# Patient Record
Sex: FEMALE | Race: BLACK OR AFRICAN AMERICAN | Employment: FULL TIME | URBAN - METROPOLITAN AREA
[De-identification: names, ages, dates, MRNs, and addresses within clinical notes are randomized per-mention and may not be internally consistent; named-entity substitution may affect disease eponyms.]

---

## 2019-03-07 ENCOUNTER — HOSPITAL ENCOUNTER (OUTPATIENT)
Dept: PHYSICAL THERAPY | Age: 69
Discharge: HOME | End: 2019-03-07
Payer: MEDICARE

## 2019-03-07 PROCEDURE — 97530 THERAPEUTIC ACTIVITIES: CPT

## 2019-03-07 PROCEDURE — 97161 PT EVAL LOW COMPLEX 20 MIN: CPT

## 2019-03-07 NOTE — PROGRESS NOTES
[address], [address]        OUTPATIENT physical Therapy Evaluation with CMS G codes    NAME: Shruthi Koenig AGE: 80 y.o. GENDER: female  DATE: 3/7/2019  REFERRING PHYSICIAN: Johanny Vivas MD  HISTORY AND BACKGROUND:  Pt is a 81 yo female presenting to Lymphedema clinic for re-evaluation of right UE secondary lymphedema and ordering of new compression garments. Pt previously treated at [address] [place] [address] for many years; last seen in 2015. Pt was discharged at that time with a custom arm sleeve and glove for R UE. Pt says she has not been seen at a lymphedema clinic since that time, but continues to wear her custom arm sleeves. Pt says she has 4 sleeves that she alternates, says she does not wear gloves due to no hand involvement. Pt has tried two vasopneumatic pumps, but says she felt they made swelling worse. Pt felt kinesiotape has helped with swelling in the past. Pt reports 12 lb weight gain over the last couple years. Past medical history: s/p modified bilateral radical mastectomy in 2003 with removal of 17 lymph nodes on the right side, pt underwent chemotherapy in 2002 and 32 radiation treatments in 2002. 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 See scanned document.    Past Medical History:   Past Medical History:   Diagnosis Date    Cancer ([facility] [address]     Hypertension      Past Surgical History:   Procedure Laterality Date    BREAST SURGERY PROCEDURE UNLISTED Current Medications:    Current Outpatient Medications   Medication Sig    lorazepam (ATIVAN) 1 mg tablet Take 1 tablet by mouth every eight (8) hours as needed for Anxiety.  hydrocodone-acetaminophen (NORCO) 5-325 mg per tablet Take 1 tablet by mouth every six (6) hours as needed for Pain.  cyclobenzaprine (FLEXERIL) 10 mg tablet Take 1 Tab by mouth three (3) times daily as needed for Muscle Spasm(s).  ibuprofen (MOTRIN) 600 mg tablet Take 1 Tab by mouth every six (6) hours as needed for Pain.  valsartan (DIOVAN) 40 mg tablet Take 40 mg by mouth daily. No current facility-administered medications for this encounter. Allergies: Allergies   Allergen Reactions    Codeine Other (comments)     Hallucinations. 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 Pt does yoga and lymphedema ex's at home. Previous Therapy:  Seen at [facility] Lymphedema clinic from 2006 to 2015  Compression/Lymphedema Equipment:  [employer] custom arm sleeves and gloves, says she has a nighttime garment (did not bring today), old bandaging supplies at home, has tried vasopneumatic pump with poor success. SUBJECTIVE:  \"I probably need some new sleeves. I've gained some weight and they are too tight. Have they come up with a cure for this yet? \"     Patients goals for therapy: reduce swelling, obtain new compression garments     EVALUATION AND OBJECTIVE DATA SUMMARY:   Pain:   pt denies pain                                Self-Care and ADLs:  Mod I  Skin and Tissue Assessment:  Dermal Status:  [x]   Intact []  Dry   []  Tenuous [] Flaky   []  Wound/lesion present []  Scars   []  Dermatitis Texture/Consistency:  [x]  Boggy: right upper arm  []  Pitting Edema   []  Brawny []  Combination   []  Fibrotic/Woody    Pigmentation/Color Change:  [x]  Normal []  Hemosiderin   []  Red []  Erythematous   []  Hyperpigmented []  Hyperlipodermatosclerosis   Anomalies:  []  Lymphorrhea []  Vesicles   []  Petechiae []  Warty Vercusis   []  Bullae []  Papilloma   Circulatory:  []  MICHELLE []  Varicosities:   []  Pulses [x]  Vascular studies ruled out DVT in past   []  DVT History    Nails:  [x]   Normal  []   Fungus    Stemmers Sign: negative   Height:   5'1\"  Weight:   177 lbs (was 150 lbs in 2015)   BMI:   33.4  (36 or greater: adversely affecting lymphedema)  Wound/Ulcer:  None       Wound Pain:   N/a   Volumetric Measurements:   Right:  3,750.49 mL Left:  2,926.12 mL   % Difference: 28.17% Dominance: right    (See scanned graph)  R UE has increased by 661.9 mL since 2015  L UE has increased by 557.2 mL since 2015  % difference was 30% in 2015 (R>L)    Range of Motion: [facility]  Strength: WFL    Sensation:   Intact  Mobility:  Modified independence  Safety:  Patient is alert and oriented:  Yes x 4   Safety awareness:  Good    Fall Risk?:  Low    Patient given written fall prevention handout: Yes   Precautions:  Standard lymphedema precautions to include avoiding blood pressure readings, injections and IVs or other procedures/acts that could lead to broken skin on affected area, and avoiding excessive heat, resistive activity or altitude without compression garment    Evaluation Time: 10:35 - 11:05 am (30 minutes)    TREATMENT PROVIDED:   Treatment time:  11:05 - 11:30 am  Minutes: 25  1. Treatment description:  Therapeutic Activity x 2  The patient was educated regarding the role and function of the lymphatic system, and instructed in the lymphedema management protocol of complete decongestive therapy (CDT).   This includes skin care to prevent breakdown or infection, lymphedema exercises, custom compression therapy options (bandaging, compression garments, compression pump, Ferol Mean, JoViPak, The Mike-Jose David, etc. as needed), and decongestion with manual lymphatic drainage as indicated. We discussed the need for consistent compression system for lymphedema management. Diaphragmatic breathing instruction and skin care education was performed, applying low pH lotion to extremity using upward strokes to stimulate lymphatic vessels. Discussed plan of care at length. Pt has a 28% limb volume difference currently. In the past, her limb volume difference has fluctuated between 25% to 45%. Pt's current garments require replacement since they are 3years old and not fitting properly. Discussed measuring pt for custom garments today or returning to phase I CDT with bandaging trial and MLD to try to reduce limb volume prior to measuring for garments. Pt has decided to try bandaging first before measuring for compression garments. Pt to bring her nighttime garments for therapist to look at next visit as well as the bandages she has at home. ASSESSMENT:   Emilia Verdugo is a 80 y.o. female who presents with stage II R UE secondary lymphedema s/p mastectomy in 2002 with removal of 17 lymph nodes. Patient has been managing lymphedema with use of custom arm sleeve, but does not wear hand piece. Patient last seen at [facility] lymphedema clinic in 2014. Discussed plan of care at length. Pt has a 28% limb volume difference currently. In the past, her limb volume difference has fluctuated between 25% to 45%. Pt's current garments require replacement since they are 3years old and not fitting properly. Discussed measuring pt for custom garments today or returning to phase I CDT with bandaging trial and MLD to try to reduce limb volume prior to measuring for garments. Pt has decided to try bandaging first before measuring for compression garments.  Pt to bring her nighttime garments for therapist to look at next visit as well as the bandages she has at home. Patient will benefit from complete decongestive therapy (CDT) including manual lymphatic drainage (MLD) technique, short-stretch textile bandages/compression system to decongest limb, and kinesiotaping as appropriate. Patient will receive instruction in proper skin care to recognize signs/symptoms of and prevent infection, therapeutic exercise, and self-MLD for independent home program and restorative lymphatic performance. This care is medically necessary due to the infection risk with lymphedema and to improve functional activities. CDT is necessary to resolve swelling to allow patient to return to wearing normal clothes/footwear and prevent worsening of symptoms such as venous stasis ulcerations, infections, or hospitalizations. Patient will be independent with home program strategies to allow improved ADL ability and mobility and to allow patient to return to greatest functional independence. Rehabilitation potential is considered to be Good. Factors which may influence rehabilitation potential include compliance with frequent re-evaluations and ordering of replacement garments every 6 months. Patient will benefit from 2x/week physical therapy visits over 6-8 weeks to optimize improvement in these areas. PLAN OF CARE:   Recommendations and Planned Interventions:  Manual lymph drainage/complete decongestive therapy  Multi-layer compression bandaging (short-stretch)  Compression garment fitting/provision  Lymphedema therapeutic exercise  AROM/PROM/Strength/Coordination  Self-care training  Functional mobility training  Education in skin care and lymphedema precautions  Self-MLD education per home program  Self-bandaging education per home program  Caregiver education as needed  Wound care as needed  SCD as indicated      GOALS  Short term goals  Time frame: 4 weeks  1.   Patient will demonstrate knowledge of signs/symptoms of infections/cellulitis and be independent in skin care to prevent cellulitis. 2.  Patient will demonstrate independence in lymphedema home program of therapeutic exercises to improve circulation and decongest limb to improve ADLs. 3.  Patient will tolerate multi-layer bandages (MLB) and show measureable decrease in R UE limb volume to <25% limb volume difference to allow ordering of home compression system (daytime, nighttime garments and pump as needed). Long term goals  Time frame: 8 weeks  1. Pt will show improvement in Lymphedema Life Impact Scale by 10 points for improved tolerance for ADLs and recreational activities. 2.  Patient will be independent with don/doff of compression system and use in order to prevent reaccumulation of fluid at discharge. 3.  Pt will be independent in self-MLD and show stable limb volumes showing decongestion and pt. ready for transition to independent restorative phase of lymphedema therapy. Physical Therapy Evaluation Charge Determination   History Examination Presentation Decision-Making   MEDIUM  Complexity : 1-2 comorbidities / personal factors will impact the outcome/ POC  LOW Complexity : 1-2 Standardized tests and measures addressing body structure, function, activity limitation and / or participation in recreation  LOW Complexity : Stable, uncomplicated  Other outcome measures LLIS  LOW       Based on the above components, the patient evaluation is determined to be of the following complexity level: LOW       Patient has participated in goal setting and agrees to work toward plan of care. Patient was instructed to call if questions or concerns arise. Thank you for this referral.  Mahesh Cuellar, PT, DPT, CLT   Time Calculation: 55 mins  TREATMENT PLAN EFFECTIVE DATES:   3/7/2019 TO 5/30/2019  I have read the above plan of care for Mishel Hutchison. I certify the above prescribed services are required by this patient and are medically necessary.   The above plan of care has been developed in conjunction with the lymphedema/physical therapist.       Physician Signature: ____________________________________Date:______________

## 2019-04-03 ENCOUNTER — HOSPITAL ENCOUNTER (OUTPATIENT)
Dept: PHYSICAL THERAPY | Age: 69
Discharge: HOME | End: 2019-04-03
Payer: MEDICARE

## 2019-04-03 PROCEDURE — 97140 MANUAL THERAPY 1/> REGIONS: CPT

## 2019-04-03 NOTE — PROGRESS NOTES
_                                   
[facility] [address] [place] Alvarado LYmphedema Therapy Visit: 2 [x]                  Daily note               []                 30 day/10th visit progress note NAME: Sam Lovett DATE: 4/3/2019 GOALS Short term goals Time frame: 4 weeks 1. Patient will demonstrate knowledge of signs/symptoms of infections/cellulitis and be independent in skin care to prevent cellulitis. 2.  Patient will demonstrate independence in lymphedema home program of therapeutic exercises to improve circulation and decongest limb to improve ADLs. 3.  Patient will tolerate multi-layer bandages (MLB) and show measureable decrease in R UE limb volume to <25% limb volume difference to allow ordering of home compression system (daytime, nighttime garments and pump as needed).  
  
Long term goals Time frame: 8 weeks 1. Pt will show improvement in Lymphedema Life Impact Scale by 10 points for improved tolerance for ADLs and recreational activities. 2.  Patient will be independent with don/doff of compression system and use in order to prevent reaccumulation of fluid at discharge. 3.  Pt will be independent in self-MLD and show stable limb volumes showing decongestion and pt. ready for transition to independent restorative phase of lymphedema therapy. SUBJECTIVE REPORT:  Patient arrives to appt with her old bandages and nighttime garment as requested. Pt eager to begin treatment of right UE lymphedema. Pain: 
 pt denies pain Gait:   
[x]  Independent gait without any assistive device for community distances ADLs:  Mod I Treatment Response:  Good response to MLD and right UE MLB [x]   Patient reviewed packet received at evaluation 
[]   Patient completed home program as prescribed 
[]   Patient not fully compliant with home program to date []   Patient waiting on compression garment arrival 
Function: limited by right UE swelling 
[]   Patient requiring less assistance with completion of home program 
[]   ADLs are requiring less assistance  
[]   Patient able to return to work/leisure activities Lymphedema Life Impact Scale: NT Weight: NT 
TREATMENT AND OBJECTIVE DATA SUMMARY:  
Patient/Family Education: Compression bandaging instructions: 1. Compression bandaging will be applied twice a week by therapist in clinic, with adjustments to be made at home as indicated if bandaging becomes loose or uncomfortable. 2. If tolerated, remain bandaged between appointments with therapist, removing under the following conditionsDO NOT WAIT FOR A RETURN PHONE [address]: 
-Numbness/tingling in extremity different from what you have experienced without the bandages in place. 
-Compromise in circulation, monitoring blood refill into toes after applying gentle pressure to toes. 
-Onset of pain in extremity that is sudden and severe in nature. -Redness, warmth in limb, and/or fever, flu-like symptoms, which may indicate infection. If this occurs, call your doctor right away. If you note a sudden increase in swelling in extremity, with or without redness/warmth, go to the emergency room as soon as possible to have blood clot ruled out. 3. Compression bandaging supplies that can be laundered are the brown compression wraps and any foam applied for padding. Launder in washer/dryer with NO fabric softener, bleach, woolite. Dry on a low heat. 4. Once compression garments are ordered, compression bandaging will be continued until garments arrive for fitting. This process can take several weeks. Educated in skin care: [x]   Skin care products []   Hygiene 
[]  Prevention of cellulitis 
[]   Wound care Educated in exercise: [x]   Walking program 
[x]   Jacqueline ball routine - pt has jacqueline messer at home, advised pt to perform ex's daily with compression bandages  
[]   Stick routine 
[]   ROM routine Instructed in self MLD:  
Written sequence given and reviewed with patient as well as demonstration and instruction during MLD portion of the session. Instructed in don/doff of compression system:  
[x]   Multi layer bandage (MLB) donning principles and wear precautions []   Day garments []   Night garments Therapeutic Exercise/Procedure minutes Treatment time:  
Jacqueline ball exercise program: Demonstration by therapist of written routine. Patient performed each x5 reps. Stick exercise program: N/A Free exercises/ROM: N/A Home program: Patient to perform daily to BID: 
[]   Skin care [x]   Deep abdominal breathing 
[x]   Exercise routine [x]   Walking program 
[]   Rest in supine  
[]   Compression bandage 
[]   Compression garments 
[]   Vasopneumatic device 
[]   Wound care 
[]   Self MLD [x]   Bring supplies to each therapy visit 
[]   Purchase necessary supplies 
[]   Weight loss program 
[]   Follow up with Rationale: Exercise will increase the lymph angiomotoricity and tissue pressure of the skin and thus decrease swelling. Modalities  minutes Treatment time:  
Vasopneumatic pump:   
 
Manual Lymphatic Drainage (MLD) 60 minutes Treatment time: 8:35 - 9:35 am 
Area to decongest:   
[x] UE 
        [x]  Right     []  Left      [] Trunk 
 
 
[] LE   
         []  Right     []  Left      [] Trunk Sequence used and effectiveness: [x] Secondary/Primary sequence for upper extremity with / without trunk involvement - pt able to tolerate supine and left sidelying positions for MLD portion. [] Secondary/Primary sequence for lower extremities with / without trunk involvement  
 
[] Modifications were made to manual lymph drainage sequence to exclude cervical techniques secondary to patient's age [x] Self MLD sequence/techniques/hand strokes - instructed in self MLD for trunk and chest wall to perform at home daily along with diaphragmatic breathing. Will review full self-MLD sequence once pt has transitioned to garments. Skin/wound care/debridement: Applied eucerin lotion to right UE using upward strokes to stimulate lymphatic vessels. Upper/Lower extremity compression: Applied multi-layer compression bandaging to: Below knee [] Thigh high  [] Upper Extremity [x] Right [x]   Left []    Bilateral [] The following multi-layer bandages were applied: 
[]  Tricofix           
[]  Silver Stockinette [x]  Tg soft 
[]  Comperm 
 
[]  Transelast  
 
Padding layer: 
[x]  Cast padding to hand/wrist/forearm with extra padding to bulk out wrist. Applied cast padding to elbow for increased comfort. []  Fleece Foam: 
[] 10cm roll 
[x] 12cm roll 
[] 15cm roll 
[] Gray foam 
[] Komprex 
[] Komprex 2 Short stretch bandages:  
[] 4cm [x] 6cm [x] 8cm [x] 10cm 
[] 12cm Tubigrip at top to secure taped areas. Educated patient in multi-layer bandaging donning principles and precautions. Kinesiotaping:   
Girth/Volume measurement: Reviewed results of volumetric measurements taken on evaluation. TOTAL TREATMENT 60 mins ASSESSMENT:  
Treatment effectiveness and tolerance: Good response to initial MLD sequence and right UE MLB. Pt is familiar with components of phase I CDT from treatment at lymphedema clinic years ago. Will continue with MLD and MLB until right UE volumes reduced and pt ready to be measured for custom garments. Progress toward goals: Progressing towards goals. PLAN OF CARE:  
Changes to the plan of care: 1. Right UE MLD and MLB. 2. Progress HEP and self-MLD. 3. Measure for garments once volumes reduced. Frequency: [x]  2 times a week 
[]  Weekly []  Biweekly []  Monthly Other:   
 
 
Cali Sosa, PT, DPT, CLT

## 2019-04-05 ENCOUNTER — HOSPITAL ENCOUNTER (OUTPATIENT)
Dept: PHYSICAL THERAPY | Age: 69
Discharge: HOME | End: 2019-04-05
Payer: MEDICARE

## 2019-04-05 PROCEDURE — 97140 MANUAL THERAPY 1/> REGIONS: CPT

## 2019-04-05 NOTE — PROGRESS NOTES
_                                   
[address] [facility] [address] [facility] [address] LYmphedema Therapy Visit: 3 [x]                  Daily note               []                 30 day/10th visit progress note NAME: Raegan Neal DATE: 4/5/2019 GOALS Short term goals Time frame: 4 weeks 1. Patient will demonstrate knowledge of signs/symptoms of infections/cellulitis and be independent in skin care to prevent cellulitis. 2.  Patient will demonstrate independence in lymphedema home program of therapeutic exercises to improve circulation and decongest limb to improve ADLs. 3.  Patient will tolerate multi-layer bandages (MLB) and show measureable decrease in R UE limb volume to <25% limb volume difference to allow ordering of home compression system (daytime, nighttime garments and pump as needed).  
  
Long term goals Time frame: 8 weeks 1. Pt will show improvement in Lymphedema Life Impact Scale by 10 points for improved tolerance for ADLs and recreational activities. 2.  Patient will be independent with don/doff of compression system and use in order to prevent reaccumulation of fluid at discharge. 3.  Pt will be independent in self-MLD and show stable limb volumes showing decongestion and pt. ready for transition to independent restorative phase of lymphedema therapy. SUBJECTIVE REPORT:  Patient arrives to appt with bandages intact right UE; says she tolerated well except for some c/o itching at elbow crease. Pain: 
 pt denies pain Gait:   
[x]  Independent gait without any assistive device for community distances ADLs:  Mod I Treatment Response:  Good response to MLD and right UE MLB [x]   Patient reviewed packet received at evaluation 
[]   Patient completed home program as prescribed 
[]   Patient not fully compliant with home program to date []   Patient waiting on compression garment arrival 
Function: limited by right UE swelling 
[]   Patient requiring less assistance with completion of home program 
[]   ADLs are requiring less assistance  
[]   Patient able to return to work/leisure activities Lymphedema Life Impact Scale: NT Weight: NT 
TREATMENT AND OBJECTIVE DATA SUMMARY:  
Patient/Family Education: Compression bandaging instructions: 1. Compression bandaging will be applied twice a week by therapist in clinic, with adjustments to be made at home as indicated if bandaging becomes loose or uncomfortable. 2. If tolerated, remain bandaged between appointments with therapist, removing under the following conditionsDO NOT WAIT FOR A RETURN PHONE Wade [address]: 
-Numbness/tingling in extremity different from what you have experienced without the bandages in place. 
-Compromise in circulation, monitoring blood refill into toes after applying gentle pressure to toes. 
-Onset of pain in extremity that is sudden and severe in nature. -Redness, warmth in limb, and/or fever, flu-like symptoms, which may indicate infection. If this occurs, call your doctor right away. If you note a sudden increase in swelling in extremity, with or without redness/warmth, go to the emergency room as soon as possible to have blood clot ruled out. 3. Compression bandaging supplies that can be laundered are the brown compression wraps and any foam applied for padding. Launder in washer/dryer with NO fabric softener, bleach, woolite. Dry on a low heat. 4. Once compression garments are ordered, compression bandaging will be continued until garments arrive for fitting. This process can take several weeks. Educated in skin care: [x]   Skin care products []   Hygiene 
[]  Prevention of cellulitis 
[]   Wound care Educated in exercise: [x]   Walking program 
[x]   Jacqueline ball routine - pt has jacqueline messer at home, advised pt to perform ex's daily with compression bandages  
[]   Stick routine 
[]   ROM routine Instructed in self MLD:  
Written sequence given and reviewed with patient as well as demonstration and instruction during MLD portion of the session. Instructed in don/doff of compression system:  
[x]   Multi layer bandage (MLB) donning principles and wear precautions []   Day garments []   Night garments Therapeutic Exercise/Procedure minutes Treatment time:  
Jacqueline ball exercise program: Demonstration by therapist of written routine. Patient performed each x5 reps. Stick exercise program: N/A Free exercises/ROM: N/A Home program: Patient to perform daily to BID: 
[]   Skin care [x]   Deep abdominal breathing 
[x]   Exercise routine [x]   Walking program 
[]   Rest in supine  
[]   Compression bandage 
[]   Compression garments 
[]   Vasopneumatic device 
[]   Wound care 
[]   Self MLD [x]   Bring supplies to each therapy visit 
[]   Purchase necessary supplies 
[]   Weight loss program 
[]   Follow up with Rationale: Exercise will increase the lymph angiomotoricity and tissue pressure of the skin and thus decrease swelling. Modalities  minutes Treatment time:  
Vasopneumatic pump:   
 
Manual Lymphatic Drainage (MLD) 40 minutes Treatment time: 8:40 - 9:20 am 
Area to decongest:   
[x] UE 
        [x]  Right     []  Left      [] Trunk 
 
 
[] LE   
         []  Right     []  Left      [] Trunk Sequence used and effectiveness: [x] Secondary/Primary sequence for upper extremity with / without trunk involvement - pt able to tolerate supine and left sidelying positions for MLD portion. [] Secondary/Primary sequence for lower extremities with / without trunk involvement  
 
[] Modifications were made to manual lymph drainage sequence to exclude cervical techniques secondary to patient's age [x] Self MLD sequence/techniques/hand strokes - instructed in self MLD for trunk and chest wall to perform at home daily along with diaphragmatic breathing. Will review full self-MLD sequence once pt has transitioned to garments. Skin/wound care/debridement: Applied calmoseptine to antecubital fossa to help with itching and sarna anti-itch lotion to entire UE using upward strokes. Upper/Lower extremity compression: Applied multi-layer compression bandaging to: Below knee [] Thigh high  [] Upper Extremity [x] Right [x]   Left []    Bilateral [] The following multi-layer bandages were applied: 
[]  Tricofix           
[]  Silver Stockinette [x]  4\" protouch stockinette  
[]  Comperm 
 
[]  Transelast  
 
Padding layer: 
[x]  Cast padding to hand/wrist/forearm with extra padding to bulk out wrist. Applied cast padding to elbow for increased comfort and also at proximal portion of bandages to help bandages stay up on arm. []  Fleece Foam: 
[] 10cm roll 
[x] 12cm roll 
[] 15cm roll 
[] Gray foam 
[] Komprex 
[] Komprex 2 Short stretch bandages:  
[] 4cm [x] 6cm [x] 8cm [x] 10cm x 2 [] 12cm Tubigrip at top to secure taped areas. Educated patient in multi-layer bandaging donning principles and precautions. Kinesiotaping:   
Girth/Volume measurement: Reviewed results of volumetric measurements taken on evaluation. TOTAL TREATMENT 40 mins ASSESSMENT:  
Treatment effectiveness and tolerance: Good response to continued MLD and right UE MLB. Pt is familiar with components of phase I CDT from treatment at lymphedema clinic years ago. Will continue with MLD and MLB until right UE volumes reduced and pt ready to be measured for custom garments. Progress toward goals: Progressing towards goals. PLAN OF CARE:  
Changes to the plan of care: 1. Right UE MLD and MLB. 2. Progress HEP and self-MLD. 3. Measure for garments once volumes reduced. 4. Recheck volumes next week. Frequency: [x]  2 times a week 
[]  Weekly []  Biweekly []  Monthly Other:   
 
 
Dashawn Horne, PT, DPT, CLT

## 2019-04-10 ENCOUNTER — HOSPITAL ENCOUNTER (OUTPATIENT)
Dept: PHYSICAL THERAPY | Age: 69
Discharge: HOME | End: 2019-04-10
Payer: MEDICARE

## 2019-04-10 PROCEDURE — 97140 MANUAL THERAPY 1/> REGIONS: CPT

## 2019-04-10 NOTE — PROGRESS NOTES
_                                   
[address] [facility] [address] [facility] [address] LYmphedema Therapy Visit: 4 [x]                  Daily note               []                 30 day/10th visit progress note NAME: Mitchell Rogel DATE: 4/10/2019 GOALS Short term goals Time frame: 4 weeks 1. Patient will demonstrate knowledge of signs/symptoms of infections/cellulitis and be independent in skin care to prevent cellulitis. 2.  Patient will demonstrate independence in lymphedema home program of therapeutic exercises to improve circulation and decongest limb to improve ADLs. 3.  Patient will tolerate multi-layer bandages (MLB) and show measureable decrease in R UE limb volume to <25% limb volume difference to allow ordering of home compression system (daytime, nighttime garments and pump as needed).  
  
Long term goals Time frame: 8 weeks 1. Pt will show improvement in Lymphedema Life Impact Scale by 10 points for improved tolerance for ADLs and recreational activities. 2.  Patient will be independent with don/doff of compression system and use in order to prevent reaccumulation of fluid at discharge. 3.  Pt will be independent in self-MLD and show stable limb volumes showing decongestion and pt. ready for transition to independent restorative phase of lymphedema therapy. SUBJECTIVE REPORT:  Patient arrives to appt with bandages intact right UE; says she rewrapped comprilan bandages over the weekend because they began to slide down. Pt reports improvement in itching at the elbow with use of calmoseptine. Pain: 
 pt denies pain Gait:   
[x]  Independent gait without any assistive device for community distances ADLs:  Mod I Treatment Response:  Good response to MLD and right UE MLB [x]   Patient reviewed packet received at evaluation []   Patient completed home program as prescribed 
[]   Patient not fully compliant with home program to date 
[]   Patient waiting on compression garment arrival 
Function: limited by right UE swelling 
[]   Patient requiring less assistance with completion of home program 
[]   ADLs are requiring less assistance  
[]   Patient able to return to work/leisure activities Lymphedema Life Impact Scale: NT Weight: NT 
TREATMENT AND OBJECTIVE DATA SUMMARY:  
Patient/Family Education: Compression bandaging instructions: 1. Compression bandaging will be applied twice a week by therapist in clinic, with adjustments to be made at home as indicated if bandaging becomes loose or uncomfortable. 2. If tolerated, remain bandaged between appointments with therapist, removing under the following conditionsDO NOT WAIT FOR A RETURN PHONE Wade Melody: 
-Numbness/tingling in extremity different from what you have experienced without the bandages in place. 
-Compromise in circulation, monitoring blood refill into toes after applying gentle pressure to toes. 
-Onset of pain in extremity that is sudden and severe in nature. -Redness, warmth in limb, and/or fever, flu-like symptoms, which may indicate infection. If this occurs, call your doctor right away. If you note a sudden increase in swelling in extremity, with or without redness/warmth, go to the emergency room as soon as possible to have blood clot ruled out. 3. Compression bandaging supplies that can be laundered are the brown compression wraps and any foam applied for padding. Launder in washer/dryer with NO fabric softener, bleach, woolite. Dry on a low heat. 4. Once compression garments are ordered, compression bandaging will be continued until garments arrive for fitting. This process can take several weeks. Educated in skin care: [x]   Skin care products []   Hygiene 
[]  Prevention of cellulitis 
[]   Wound care Educated in exercise: [x]   Walking program 
[x]   Jacqueline ball routine - pt has jacqueline messer at home, advised pt to perform ex's daily with compression bandages  
[]   Stick routine 
[]   ROM routine Instructed in self MLD:  
Written sequence given and reviewed with patient as well as demonstration and instruction during MLD portion of the session. Instructed in don/doff of compression system:  
[x]   Multi layer bandage (MLB) donning principles and wear precautions []   Day garments []   Night garments Therapeutic Exercise/Procedure minutes Treatment time:  
Jacqueline ball exercise program: Demonstration by therapist of written routine. Patient performed each x5 reps. Stick exercise program: N/A Free exercises/ROM: N/A Home program: Patient to perform daily to BID: 
[]   Skin care [x]   Deep abdominal breathing 
[x]   Exercise routine [x]   Walking program 
[]   Rest in supine  
[]   Compression bandage 
[]   Compression garments 
[]   Vasopneumatic device 
[]   Wound care 
[]   Self MLD [x]   Bring supplies to each therapy visit 
[]   Purchase necessary supplies 
[]   Weight loss program 
[]   Follow up with Rationale: Exercise will increase the lymph angiomotoricity and tissue pressure of the skin and thus decrease swelling. Modalities  minutes Treatment time:  
Vasopneumatic pump:   
 
Manual Lymphatic Drainage (MLD) 55 minutes Treatment time: 8:45 - 9:40 am 
Area to decongest:   
[x] UE 
        [x]  Right     []  Left      [] Trunk 
 
 
[] LE   
         []  Right     []  Left      [] Trunk Sequence used and effectiveness: [x] Secondary/Primary sequence for upper extremity with / without trunk involvement - pt able to tolerate supine and left sidelying positions for MLD portion.   
 
[] Secondary/Primary sequence for lower extremities with / without trunk involvement  
 
[] Modifications were made to manual lymph drainage sequence to exclude cervical techniques secondary to patient's age [x] Self MLD sequence/techniques/hand strokes - instructed in self MLD for trunk and chest wall to perform at home daily along with diaphragmatic breathing. Will review full self-MLD sequence once pt has transitioned to garments. Skin/wound care/debridement: Applied calmoseptine to antecubital fossa to help with itching and sarna anti-itch lotion to entire UE using upward strokes. Upper/Lower extremity compression: Applied multi-layer compression bandaging to: Below knee [] Thigh high  [] Upper Extremity [x] Right [x]   Left []    Bilateral [] The following multi-layer bandages were applied: 
[]  Tricofix           
[]  Silver Stockinette [x]  4\" protouch stockinette  
[]  Comperm 
 
[]  Transelast  
 
Padding layer: 
[x]  Cast padding to hand/wrist/forearm with extra padding to bulk out wrist. Applied cast padding to elbow for increased comfort and also at proximal portion of bandages to help bandages stay up on arm. []  Fleece Foam: 
[] 10cm roll 
[x] 12cm roll 
[] 15cm roll 
[] Gray foam 
[] Komprex 
[] Komprex 2 Short stretch bandages:  
[] 4cm [x] 6cm [x] 8cm [x] 10cm x 2 [] 12cm Tubigrip at top to secure taped areas. Educated patient in multi-layer bandaging donning principles and precautions. Kinesiotaping:   
Girth/Volume measurement: Repeat volumes:  
 
                           Right                   Left 4/10/19              4,099.1mL         2,926. 1mL 
3/7/19                3,750.5mL         2,926. 1mL 
 
% difference: 40.09% (was 28.17% at eval) R UE has increased by 348. 58 mL since eval last month. TOTAL TREATMENT 55 mins ASSESSMENT:  
Treatment effectiveness and tolerance: Good response to continued MLD and right UE MLB.  Pt's volumes up today as compared with eval 1 month ago; possibly due to warmer temperatures and also pt reports she reapplied comprilan portion of bandages the other day due to sliding and bandages appeared too loose today. Reinforced need to applied bandages with increased tension if she is going to redo bandages at home; pt verbalized understanding. Pt will require continued MLD and MLB to reduce right UE limb volumes before measuring for custom garments. Pt reports decreased compliance with nighttime garment and pump in the past, but pt may benefit from at least nighttime garment for long-term maintenance of chronic lymphedema. Pt is familiar with components of phase I CDT from treatment at lymphedema clinic years ago. Will continue with MLD and MLB until right UE volumes reduced and pt ready to be measured for custom garments. Progress toward goals: Progressing towards goals. PLAN OF CARE:  
Changes to the plan of care: 1. Right UE MLD and MLB. 2. Progress HEP and self-MLD. 3. Measure for garments once volumes reduced. 4. Recheck volumes next week. Frequency: [x]  2 times a week 
[]  Weekly []  Biweekly []  Monthly Other:   
 
 
Little Currie, PT, DPT, CLT

## 2019-04-12 ENCOUNTER — HOSPITAL ENCOUNTER (OUTPATIENT)
Dept: PHYSICAL THERAPY | Age: 69
Discharge: HOME | End: 2019-04-12
Payer: MEDICARE

## 2019-04-12 PROCEDURE — 97140 MANUAL THERAPY 1/> REGIONS: CPT

## 2019-04-12 PROCEDURE — 97016 VASOPNEUMATIC DEVICE THERAPY: CPT

## 2019-04-12 NOTE — PROGRESS NOTES
_                                   
[facility] [address] [place] [address] [address] Alvar[address] LYmphedema Therapy Visit: 5 [x]                  Daily note               []                 30 day/10th visit progress note NAME: Jevon Palacios DATE: 4/12/2019 GOALS Short term goals Time frame: 4 weeks 1. Patient will demonstrate knowledge of signs/symptoms of infections/cellulitis and be independent in skin care to prevent cellulitis. 2.  Patient will demonstrate independence in lymphedema home program of therapeutic exercises to improve circulation and decongest limb to improve ADLs. 3.  Patient will tolerate multi-layer bandages (MLB) and show measureable decrease in R UE limb volume to <25% limb volume difference to allow ordering of home compression system (daytime, nighttime garments and pump as needed).  
  
Long term goals Time frame: 8 weeks 1. Pt will show improvement in Lymphedema Life Impact Scale by 10 points for improved tolerance for ADLs and recreational activities. 2.  Patient will be independent with don/doff of compression system and use in order to prevent reaccumulation of fluid at discharge. 3.  Pt will be independent in self-MLD and show stable limb volumes showing decongestion and pt. ready for transition to independent restorative phase of lymphedema therapy. SUBJECTIVE REPORT:  Patient arrives to appt with bandages intact right UE, reports poor tolerance. Pt says bandages felt \"too tight\" and she could not bend elbow. Pain: 
 pt denies pain Gait:   
[x]  Independent gait without any assistive device for community distances ADLs:  Mod I Treatment Response:  Good response to pump right UE and right UE MLB [x]   Patient reviewed packet received at evaluation 
[]   Patient completed home program as prescribed []   Patient not fully compliant with home program to date 
[]   Patient waiting on compression garment arrival 
Function: limited by right UE swelling 
[]   Patient requiring less assistance with completion of home program 
[]   ADLs are requiring less assistance  
[]   Patient able to return to work/leisure activities Lymphedema Life Impact Scale: NT Weight: NT 
TREATMENT AND OBJECTIVE DATA SUMMARY:  
Patient/Family Education: Compression bandaging instructions: 1. Compression bandaging will be applied twice a week by therapist in clinic, with adjustments to be made at home as indicated if bandaging becomes loose or uncomfortable. 2. If tolerated, remain bandaged between appointments with therapist, removing under the following conditionsDO NOT WAIT FOR A RETURN PHONE [place]: 
-Numbness/tingling in extremity different from what you have experienced without the bandages in place. 
-Compromise in circulation, monitoring blood refill into toes after applying gentle pressure to toes. 
-Onset of pain in extremity that is sudden and severe in nature. -Redness, warmth in limb, and/or fever, flu-like symptoms, which may indicate infection. If this occurs, call your doctor right away. If you note a sudden increase in swelling in extremity, with or without redness/warmth, go to the emergency room as soon as possible to have blood clot ruled out. 3. Compression bandaging supplies that can be laundered are the brown compression wraps and any foam applied for padding. Launder in washer/dryer with NO fabric softener, bleach, woolite. Dry on a low heat. 4. Once compression garments are ordered, compression bandaging will be continued until garments arrive for fitting. This process can take several weeks. Educated in skin care: [x]   Skin care products []   Hygiene 
[]  Prevention of cellulitis 
[]   Wound care Educated in exercise: [x]   Walking program 
 [x]   Jacqueline messer routine - pt has jacqueline messer at home, advised pt to perform ex's daily with compression bandages  
[]   Stick routine 
[]   ROM routine Instructed in self MLD:  
Written sequence given and reviewed with patient as well as demonstration and instruction during MLD portion of the session. Instructed in don/doff of compression system:  
[x]   Multi layer bandage (MLB) donning principles and wear precautions []   Day garments []   Night garments Therapeutic Exercise/Procedure minutes Treatment time:  
Jacqueline messer exercise program: Demonstration by therapist of written routine. Patient performed each x5 reps. Stick exercise program: N/A Free exercises/ROM: N/A Home program: Patient to perform daily to BID: 
[]   Skin care [x]   Deep abdominal breathing 
[x]   Exercise routine [x]   Walking program 
[]   Rest in supine  
[]   Compression bandage 
[]   Compression garments 
[]   Vasopneumatic device 
[]   Wound care 
[]   Self MLD [x]   Bring supplies to each therapy visit 
[]   Purchase necessary supplies 
[]   Weight loss program 
[]   Follow up with Rationale: Exercise will increase the lymph angiomotoricity and tissue pressure of the skin and thus decrease swelling. Modalities 40 minutes Treatment time: 8:45 - 9:25 am 
Vasopneumatic pump: Entree pump to right UE x 40 min. Manual Lymphatic Drainage (MLD) 25 minutes Treatment time: 9:25 - 9:50 am 
Area to decongest:   
[x] UE 
        [x]  Right     []  Left      [] Trunk 
 
 
[] LE   
         []  Right     []  Left      [] Trunk Sequence used and effectiveness: [] Secondary/Primary sequence for upper extremity with / without trunk involvement - pt able to tolerate supine and left sidelying positions for MLD portion. - deferred today due to pump use.  
 
[] Secondary/Primary sequence for lower extremities with / without trunk involvement [] Modifications were made to manual lymph drainage sequence to exclude cervical techniques secondary to patient's age 
 
[] Self MLD sequence/techniques/hand strokes - instructed in self MLD for trunk and chest wall to perform at home daily along with diaphragmatic breathing. Will review full self-MLD sequence once pt has transitioned to garments. Skin/wound care/debridement: Small indentation to right medial forearm, possibly due to creasing of foam under bandages. Applied calmoseptine to antecubital fossa to help with itching and sarna anti-itch lotion to entire UE using upward strokes. Upper/Lower extremity compression: Applied multi-layer compression bandaging to: Below knee [] Thigh high  [] Upper Extremity [x] Right [x]   Left []    Bilateral [] The following multi-layer bandages were applied: 
[]  Tricofix           
[]  Silver Stockinette [x]  4\" protouch stockinette  
[]  Comperm 
 
[]  Transelast  
 
Padding layer: 
[x]  Cast padding to hand/wrist/forearm with extra padding to bulk out wrist. Applied cast padding  from MCP to axilla to provide extra padding at elbow and to help prevent foam from causing any indentations in skin for increased comfort. []  Fleece Foam: 
[] 10cm roll 
[x] 12cm roll 
[] 15cm roll 
[] Gray foam 
[] Komprex 
[] Komprex 2 Short stretch bandages:  
[] 4cm [x] 6cm [x] 8cm [x] 10cm  
[] 12cm Tubigrip at top to secure taped areas. Pt able to demonstrate full shoulder ROM and adequate elbow flexion to allow for pt to perform functional tasks. Educated patient in multi-layer bandaging donning principles and precautions. Kinesiotaping:   
Girth/Volume measurement: Repeat volumes:  
 
                           Right                   Left 4/10/19              4,099.1mL         2,926. 1mL 
3/7/19                3,750.5mL         2,926. 1mL 
 
% difference: 40.09% (was 28.17% at eval) R UE has increased by 348. 58 mL since eval last month. TOTAL TREATMENT 65 mins ASSESSMENT:  
Treatment effectiveness and tolerance: Good response to pump use and right UE MLB. Pt says she has flexitouch pump at home but did not use because it was difficult to put on and she didn't notice a difference in swelling after pump use. Advised pt that they do make different pumps that are one piece that we could try to get for her, however pt reports she would not use it at home. Pt's volumes up last visit after bandaging herself at home. Reinforced need to applied bandages with increased tension if she is going to redo bandages at home; pt verbalized understanding. Pt will require continued MLD and MLB to reduce right UE limb volumes before measuring for custom garments. Pt reports decreased compliance with nighttime garment and pump in the past, but pt may benefit from at least nighttime garment for long-term maintenance of chronic lymphedema. Pt is familiar with components of phase I CDT from treatment at lymphedema clinic years ago. Will continue with MLD and MLB until right UE volumes reduced and pt ready to be measured for custom garments. Progress toward goals: Progressing towards goals. PLAN OF CARE:  
Changes to the plan of care: 1. Right UE MLD and MLB. 2. Progress HEP and self-MLD. 3. Measure for garments once volumes reduced. 4. Recheck volumes next week. Frequency: [x]  2 times a week 
[]  Weekly []  Biweekly []  Monthly Other:   
 
 
Rafat Alexander, PT, DPT, CLT

## 2019-04-15 ENCOUNTER — HOSPITAL ENCOUNTER (OUTPATIENT)
Dept: PHYSICAL THERAPY | Age: 69
Discharge: HOME | End: 2019-04-15
Payer: MEDICARE

## 2019-04-15 PROCEDURE — 97140 MANUAL THERAPY 1/> REGIONS: CPT

## 2019-04-15 NOTE — PROGRESS NOTES
_                                   
[facility] [address] Marco Alvarado LYmphedema Therapy Visit: 6 [x]                  Daily note               []                 30 day/10th visit progress note NAME: Mckinley Valdes DATE: 4/15/2019 GOALS Short term goals Time frame: 4 weeks 1. Patient will demonstrate knowledge of signs/symptoms of infections/cellulitis and be independent in skin care to prevent cellulitis. 2.  Patient will demonstrate independence in lymphedema home program of therapeutic exercises to improve circulation and decongest limb to improve ADLs. 3.  Patient will tolerate multi-layer bandages (MLB) and show measureable decrease in R UE limb volume to <25% limb volume difference to allow ordering of home compression system (daytime, nighttime garments and pump as needed).  
  
Long term goals Time frame: 8 weeks 1. Pt will show improvement in Lymphedema Life Impact Scale by 10 points for improved tolerance for ADLs and recreational activities. 2.  Patient will be independent with don/doff of compression system and use in order to prevent reaccumulation of fluid at discharge. 3.  Pt will be independent in self-MLD and show stable limb volumes showing decongestion and pt. ready for transition to independent restorative phase of lymphedema therapy. SUBJECTIVE REPORT:  Patient arrives to appt with bandages intact right UE, stating she removed bandaging at home and reapplied. Agreeable to proceeding with treatment today. Pain: 
 pt denies pain Gait:   
[x]  Independent gait without any assistive device for community distances ADLs:  Mod I Treatment Response:  Good response to pump right UE and right UE MLB [x]   Patient reviewed packet received at evaluation 
[]   Patient completed home program as prescribed []   Patient not fully compliant with home program to date 
[]   Patient waiting on compression garment arrival 
Function: limited by right UE swelling 
[]   Patient requiring less assistance with completion of home program 
[]   ADLs are requiring less assistance  
[]   Patient able to return to work/leisure activities Lymphedema Life Impact Scale: NT Weight: NT 
TREATMENT AND OBJECTIVE DATA SUMMARY:  
Patient/Family Education: Compression bandaging instructions: 1. Compression bandaging will be applied twice a week by therapist in clinic, with adjustments to be made at home as indicated if bandaging becomes loose or uncomfortable. 2. If tolerated, remain bandaged between appointments with therapist, removing under the following conditionsDO NOT WAIT FOR A RETURN PHONE [place]: 
-Numbness/tingling in extremity different from what you have experienced without the bandages in place. 
-Compromise in circulation, monitoring blood refill into toes after applying gentle pressure to toes. 
-Onset of pain in extremity that is sudden and severe in nature. -Redness, warmth in limb, and/or fever, flu-like symptoms, which may indicate infection. If this occurs, call your doctor right away. If you note a sudden increase in swelling in extremity, with or without redness/warmth, go to the emergency room as soon as possible to have blood clot ruled out. 3. Compression bandaging supplies that can be laundered are the brown compression wraps and any foam applied for padding. Launder in washer/dryer with NO fabric softener, bleach, woolite. Dry on a low heat. 4. Once compression garments are ordered, compression bandaging will be continued until garments arrive for fitting. This process can take several weeks. Educated in skin care: [x]   Skin care products []   Hygiene 
[]  Prevention of cellulitis 
[]   Wound care Educated in exercise: [x]   Walking program 
 [x]   Jacqueline messer routine - pt has jacqueline messer at home, advised pt to perform ex's daily with compression bandages  
[]   Stick routine 
[]   ROM routine Instructed in self MLD:  
Written sequence given and reviewed with patient as well as demonstration and instruction during MLD portion of the session. Instructed in don/doff of compression system:  
[x]   Multi layer bandage (MLB) donning principles and wear precautions []   Day garments []   Night garments Therapeutic Exercise/Procedure minutes Treatment time:  
Jacqueline messer exercise program: Demonstration by therapist of written routine. Patient performed each x5 reps. Stick exercise program: N/A Free exercises/ROM: N/A Home program: Patient to perform daily to BID: 
[]   Skin care [x]   Deep abdominal breathing 
[x]   Exercise routine [x]   Walking program 
[]   Rest in supine  
[]   Compression bandage 
[]   Compression garments 
[]   Vasopneumatic device 
[]   Wound care 
[]   Self MLD [x]   Bring supplies to each therapy visit 
[]   Purchase necessary supplies 
[]   Weight loss program 
[]   Follow up with Rationale: Exercise will increase the lymph angiomotoricity and tissue pressure of the skin and thus decrease swelling. Modalities  minutes Treatment time: Deferred Vasopneumatic pump:   
 
Manual Therapy: 8:35-9:32 am 57 minutes Area to decongest:   
[x] UE 
        [x]  Right     []  Left      [] Trunk 
 
 
[] LE   
         []  Right     []  Left      [] Trunk Sequence used and effectiveness: [x] Secondary sequence for upper extremity with / without trunk involvement - pt able to tolerate supine and left sidelying positions for MLD portion. Additional time spent clearing trunk and upper arm. Gentle cross friction massage to limited area of heavy scarring R breast incision. [] Secondary/Primary sequence for lower extremities with / without trunk involvement [] Modifications were made to manual lymph drainage sequence to exclude cervical techniques secondary to patient's age 
 
[] Self MLD sequence/techniques/hand strokes - instructed in self MLD for trunk and chest wall to perform at home daily along with diaphragmatic breathing. Will review full self-MLD sequence once pt has transitioned to garments. Skin/wound care/debridement: Small indentation to right medial forearm, possibly due to creasing of foam under bandages. Applied calmoseptine to antecubital fossa to help with itching and sarna anti-itch lotion to entire UE using upward strokes. Upper/Lower extremity compression: Applied multi-layer compression bandaging to: Below knee [] Thigh high  [] Upper Extremity [x] Right [x]   Left []    Bilateral [] The following multi-layer bandages were applied: 
[]  Tricofix           
[]  Silver Stockinette [x]  4\" protouch stockinette  
[]  Comperm 
 
[]  Transelast  
 
Padding layer: 
[x]  Cast padding to hand/wrist/forearm with extra padding to bulk out wrist. Applied cast padding  from MCP to axilla to provide extra padding at elbow and to help prevent foam from causing any indentations in skin for increased comfort. []  Fleece Foam: 
[] 10cm roll 
[x] 12cm roll 
[] 15cm roll 
[] Gray foam 
[] Komprex 
[] Komprex 2 Short stretch bandages:  
[] 4cm [x] 6cm [x] 8cm [x] 10cm  
[] 12cm Tubigrip at top to secure taped areas. Pt able to demonstrate full shoulder ROM and adequate elbow flexion to allow for pt to perform functional tasks. Educated patient in multi-layer bandaging donning principles and precautions. Kinesiotaping:   
Girth/Volume measurement: Girth measurements as noted below. Volumes:  
 
                           Right                   Left 4/10/19              4,099.1mL         2,926. 1mL 
3/7/19                3,750.5mL         2,926. 1mL 
 
% difference: 40.09% (was 28.17% at eval) R UE has increased by 348. 58 mL since eval last month. Affected Side: R LE Left (cm) Right (cm) Base 3rd finger 6 [employer] 18.2 Wrist   
  16.3 Mid Forearm 30.2 Elbow 32.7 Axilla 43 Length TOTAL TREATMENT 57 mins ASSESSMENT:  
Treatment effectiveness and tolerance: See girth measurements above. Reinforced need to applied bandages with increased tension if she is going to redo bandages at home; pt verbalized understanding. Pt will require continued MLD and MLB to reduce right UE limb volumes before measuring for custom garments. Pt reports decreased compliance with nighttime garment and pump in the past, but pt may benefit from at least nighttime garment for long-term maintenance of chronic lymphedema. Pt is familiar with components of phase I CDT from treatment at lymphedema clinic years ago. Will continue with MLD and MLB until right UE volumes reduced and pt ready to be measured for custom garments. Progress toward goals: Progressing towards goals. PLAN OF CARE:  
Changes to the plan of care: 1. Right UE MLD and MLB. 2. Progress HEP and self-MLD. 3. Measure for garments once volumes reduced. 4. Recheck volumes next week. Frequency: [x]  2 times a week 
[]  Weekly []  Biweekly []  Monthly Other:   
 
 
Nneka Fatima, PT, CLT

## 2019-04-19 ENCOUNTER — HOSPITAL ENCOUNTER (OUTPATIENT)
Dept: PHYSICAL THERAPY | Age: 69
Discharge: HOME | End: 2019-04-19
Payer: MEDICARE

## 2019-04-19 PROCEDURE — 97530 THERAPEUTIC ACTIVITIES: CPT

## 2019-04-19 NOTE — PROGRESS NOTES
_                                   
[address] [facility] [address] [place] [address]. [address] Marco Alvarado 20 LYmphedema Therapy Visit: 7 [x]                  Daily note               []                 30 day/10th visit progress note NAME: Evangelista Hummel DATE: 4/19/2019 GOALS Short term goals Time frame: 4 weeks 1. Patient will demonstrate knowledge of signs/symptoms of infections/cellulitis and be independent in skin care to prevent cellulitis. 2.  Patient will demonstrate independence in lymphedema home program of therapeutic exercises to improve circulation and decongest limb to improve ADLs. 3.  Patient will tolerate multi-layer bandages (MLB) and show measureable decrease in R UE limb volume to <25% limb volume difference to allow ordering of home compression system (daytime, nighttime garments and pump as needed).  
  
Long term goals Time frame: 8 weeks 1. Pt will show improvement in Lymphedema Life Impact Scale by 10 points for improved tolerance for ADLs and recreational activities. 2.  Patient will be independent with don/doff of compression system and use in order to prevent reaccumulation of fluid at discharge. 3.  Pt will be independent in self-MLD and show stable limb volumes showing decongestion and pt. ready for transition to independent restorative phase of lymphedema therapy. SUBJECTIVE REPORT:  Patient arrives to appt with bandages intact right UE, stating she removed bandaging at home and reapplied. Agreeable to proceeding with abbreviated treatment today due to primary therapist being out today. Patient requested slightly more tension with bandaging application today. Pain: 
 pt denies pain Gait:   
[x]  Independent gait without any assistive device for community distances ADLs:  Mod I Treatment Response:  Good response to pump right UE and right UE MLB 
 [x]   Patient reviewed packet received at evaluation 
[]   Patient completed home program as prescribed 
[]   Patient not fully compliant with home program to date 
[]   Patient waiting on compression garment arrival 
Function: limited by right UE swelling 
[]   Patient requiring less assistance with completion of home program 
[]   ADLs are requiring less assistance  
[]   Patient able to return to work/leisure activities Lymphedema Life Impact Scale: NT Weight: NT 
TREATMENT AND OBJECTIVE DATA SUMMARY:  
Patient/Family Education: Compression bandaging instructions: 1. Compression bandaging will be applied twice a week by therapist in clinic, with adjustments to be made at home as indicated if bandaging becomes loose or uncomfortable. 2. If tolerated, remain bandaged between appointments with therapist, removing under the following conditionsDO NOT WAIT FOR A RETURN PHONE Waed Oliver: 
-Numbness/tingling in extremity different from what you have experienced without the bandages in place. 
-Compromise in circulation, monitoring blood refill into toes after applying gentle pressure to toes. 
-Onset of pain in extremity that is sudden and severe in nature. -Redness, warmth in limb, and/or fever, flu-like symptoms, which may indicate infection. If this occurs, call your doctor right away. If you note a sudden increase in swelling in extremity, with or without redness/warmth, go to the emergency room as soon as possible to have blood clot ruled out. 3. Compression bandaging supplies that can be laundered are the brown compression wraps and any foam applied for padding. Launder in washer/dryer with NO fabric softener, bleach, woolite. Dry on a low heat. 4. Once compression garments are ordered, compression bandaging will be continued until garments arrive for fitting. This process can take several weeks. Educated in skin care: [x]   Skin care products []   Hygiene []  Prevention of cellulitis 
[]   Wound care Educated in exercise: [x]   Walking program 
[x]   Jacqueline ball routine - pt has jacquelineaileen messer at home, advised pt to perform ex's daily with compression bandages  
[]   Stick routine 
[]   ROM routine Instructed in self MLD:  
Written sequence given and reviewed with patient as well as demonstration and instruction during MLD portion of the session. Instructed in don/doff of compression system:  
[x]   Multi layer bandage (MLB) donning principles and wear precautions []   Day garments []   Night garments Therapeutic Exercise/Procedure minutes Treatment time:  
Jacqueline ball exercise program: Demonstration by therapist of written routine. Patient performed each x5 reps. Stick exercise program: N/A Free exercises/ROM: N/A Home program: Patient to perform daily to BID: 
[]   Skin care [x]   Deep abdominal breathing 
[x]   Exercise routine [x]   Walking program 
[]   Rest in supine  
[]   Compression bandage 
[]   Compression garments 
[]   Vasopneumatic device 
[]   Wound care 
[]   Self MLD [x]   Bring supplies to each therapy visit 
[]   Purchase necessary supplies 
[]   Weight loss program 
[]   Follow up with Rationale: Exercise will increase the lymph angiomotoricity and tissue pressure of the skin and thus decrease swelling. Modalities  minutes Treatment time: Deferred Vasopneumatic pump:   
 
Therapeutic activity: 8:35-8:50 am 15 minutes Area to decongest:   
[x] UE 
        [x]  Right     []  Left      [] Trunk 
 
 
[] LE   
         []  Right     []  Left      [] Trunk Sequence used and effectiveness: Deferred due to limited time [] Secondary sequence for upper extremity with / without trunk involvement  
 
[] Secondary/Primary sequence for lower extremities with / without trunk involvement  
 
[] Modifications were made to manual lymph drainage sequence to exclude cervical techniques secondary to patient's age [] Self MLD sequence/techniques/hand strokes - instructed in self MLD for trunk and chest wall to perform at home daily along with diaphragmatic breathing. Will review full self-MLD sequence once pt has transitioned to garments. Skin/wound care/debridement: Small indentation to right medial forearm, possibly due to creasing of foam under bandages. Applied calmoseptine to antecubital fossa to help with itching and sarna anti-itch lotion to entire UE using upward strokes. Upper/Lower extremity compression: Applied multi-layer compression bandaging to: Below knee [] Thigh high  [] Upper Extremity [x] Right [x]   Left []    Bilateral [] The following multi-layer bandages were applied: 
[]  Tricofix           
[]  Silver Stockinette [x]  4\" protouch stockinette  
[]  Comperm 
 
[]  Transelast  
 
Padding layer: 
[x]  Cast padding to hand/wrist/forearm with extra padding to bulk out wrist. Applied cast padding  from MCP to antecubital fossa to provide extra padding at elbow and to help prevent foam from causing any indentations in skin for increased comfort. []  Fleece Foam: 
[] 10cm roll 
[x] 12cm roll 
[] 15cm roll 
[] Gray foam 
[] Komprex 
[] Komprex 2 Short stretch bandages:  
[] 4cm [x] 6cm [x] 8cm [x] 10cm  
[] 12cm Tubigrip at top to secure taped areas. Pt able to demonstrate full shoulder ROM and adequate elbow flexion to allow for pt to perform functional tasks. Educated patient in multi-layer bandaging donning principles and precautions. Kinesiotaping:   
Girth/Volume measurement: Girth measurements as noted below. Volumes:  
 
                           Right                   Left 4/10/19              4,099.1mL         2,926. 1mL 
3/7/19                3,750.5mL         2,926. 1mL 
 
% difference: 40.09% (was 28.17% at eval) R UE has increased by 348. 58 mL since eval last month. TOTAL TREATMENT 15 mins ASSESSMENT:  
 Treatment effectiveness and tolerance: Applied bandages with increased tension per patient request, with patient stating bandaging comfortable upon completion of application. Pt will require continued MLD and MLB to reduce right UE limb volumes before measuring for custom garments. Pt reports decreased compliance with nighttime garment and pump in the past, but pt may benefit from at least nighttime garment for long-term maintenance of chronic lymphedema. Pt is familiar with components of phase I CDT from treatment at lymphedema clinic years ago. Will continue with MLD and MLB until right UE volumes reduced and pt ready to be measured for custom garments. Progress toward goals: Progressing towards goals. PLAN OF CARE:  
Changes to the plan of care: 1. Right UE resume MLD and continue MLB. 2. Progress HEP and self-MLD. 3. Measure for garments once volumes reduced. 4. Recheck volumes next week. Frequency: [x]  2 times a week 
[]  Weekly []  Biweekly []  Monthly Other:   
 
 
Wei Richard PT, CLT

## 2019-04-24 ENCOUNTER — HOSPITAL ENCOUNTER (OUTPATIENT)
Dept: PHYSICAL THERAPY | Age: 69
Discharge: HOME | End: 2019-04-24
Payer: MEDICARE

## 2019-04-24 PROCEDURE — 97140 MANUAL THERAPY 1/> REGIONS: CPT

## 2019-04-24 NOTE — PROGRESS NOTES
_                                   
[facility] [address] [facility]. [address] Marco Alvarado 20 LYmphedema Therapy Visit: 8 [x]                  Daily note               []                 30 day/10th visit progress note NAME: Sunitha Corral DATE: 4/24/2019 GOALS Short term goals Time frame: 4 weeks 1. Patient will demonstrate knowledge of signs/symptoms of infections/cellulitis and be independent in skin care to prevent cellulitis. MET 4/24/19 2. Patient will demonstrate independence in lymphedema home program of therapeutic exercises to improve circulation and decongest limb to improve ADLs. MET 4/24/19 3. Patient will tolerate multi-layer bandages (MLB) and show measureable decrease in R UE limb volume to <25% limb volume difference to allow ordering of home compression system (daytime, nighttime garments and pump as needed).  
  
Long term goals Time frame: 8 weeks 1. Pt will show improvement in Lymphedema Life Impact Scale by 10 points for improved tolerance for ADLs and recreational activities. 2.  Patient will be independent with don/doff of compression system and use in order to prevent reaccumulation of fluid at discharge. 3.  Pt will be independent in self-MLD and show stable limb volumes showing decongestion and pt. ready for transition to independent restorative phase of lymphedema therapy. SUBJECTIVE REPORT:  Patient arrives to appt with bandages intact right UE, stating she removed comprilan portion of bandaging at home and reapplied. Noted bandages appear slid down at the top today and applied too loosely. Pain: 
 pt denies pain Gait:   
[x]  Independent gait without any assistive device for community distances ADLs:  Mod I Treatment Response:  Good response to right UE MLB [x]   Patient reviewed packet received at evaluation []   Patient completed home program as prescribed 
[]   Patient not fully compliant with home program to date 
[]   Patient waiting on compression garment arrival 
Function: limited by right UE swelling 
[]   Patient requiring less assistance with completion of home program 
[]   ADLs are requiring less assistance  
[]   Patient able to return to work/leisure activities Lymphedema Life Impact Scale: NT Weight: NT 
TREATMENT AND OBJECTIVE DATA SUMMARY:  
Patient/Family Education: Compression bandaging instructions: 1. Compression bandaging will be applied twice a week by therapist in clinic, with adjustments to be made at home as indicated if bandaging becomes loose or uncomfortable. 2. If tolerated, remain bandaged between appointments with therapist, removing under the following conditionsDO NOT WAIT FOR A RETURN PHONE Wade Melody: 
-Numbness/tingling in extremity different from what you have experienced without the bandages in place. 
-Compromise in circulation, monitoring blood refill into toes after applying gentle pressure to toes. 
-Onset of pain in extremity that is sudden and severe in nature. -Redness, warmth in limb, and/or fever, flu-like symptoms, which may indicate infection. If this occurs, call your doctor right away. If you note a sudden increase in swelling in extremity, with or without redness/warmth, go to the emergency room as soon as possible to have blood clot ruled out. 3. Compression bandaging supplies that can be laundered are the brown compression wraps and any foam applied for padding. Launder in washer/dryer with NO fabric softener, bleach, woolite. Dry on a low heat. 4. Once compression garments are ordered, compression bandaging will be continued until garments arrive for fitting. This process can take several weeks. Educated in skin care: [x]   Skin care products []   Hygiene 
[]  Prevention of cellulitis 
[]   Wound care Educated in exercise: [x]   Walking program 
[x]   Jacqueline messer routine - pt has jacqueline messer at home, advised pt to perform ex's daily with compression bandages  
[]   Stick routine 
[]   ROM routine Instructed in self MLD:  
Written sequence given and reviewed with patient as well as demonstration and instruction during MLD portion of the session. Instructed in don/doff of compression system:  
[x]   Multi layer bandage (MLB) donning principles and wear precautions []   Day garments []   Night garments Therapeutic Exercise/Procedure minutes Treatment time:  
Jacqueline ball exercise program: Demonstration by therapist of written routine. Patient performed each x5 reps. Stick exercise program: N/A Free exercises/ROM: N/A Home program: Patient to perform daily to BID: 
[]   Skin care [x]   Deep abdominal breathing 
[x]   Exercise routine [x]   Walking program 
[]   Rest in supine  
[]   Compression bandage 
[]   Compression garments 
[]   Vasopneumatic device 
[]   Wound care 
[]   Self MLD [x]   Bring supplies to each therapy visit 
[]   Purchase necessary supplies 
[]   Weight loss program 
[]   Follow up with Rationale: Exercise will increase the lymph angiomotoricity and tissue pressure of the skin and thus decrease swelling. Modalities  minutes Treatment time: Deferred Vasopneumatic pump:   
 
Manual therapy: 8:30 - 9:30 am 60 minutes Area to decongest:   
[x] UE 
        [x]  Right     []  Left      [] Trunk 
 
 
[] LE   
         []  Right     []  Left      [] Trunk Sequence used and effectiveness: Deferred due to limited time [] Secondary sequence for upper extremity with / without trunk involvement  
 
[] Secondary/Primary sequence for lower extremities with / without trunk involvement  
 
[] Modifications were made to manual lymph drainage sequence to exclude cervical techniques secondary to patient's age [] Self MLD sequence/techniques/hand strokes - instructed in self MLD for trunk and chest wall to perform at home daily along with diaphragmatic breathing. Will review full self-MLD sequence once pt has transitioned to garments. Skin/wound care/debridement: Applied calmoseptine to antecubital fossa to help with itching and sarna anti-itch lotion to entire UE using upward strokes. Upper/Lower extremity compression: Applied multi-layer compression bandaging to: Below knee [] Thigh high  [] Upper Extremity [x] Right [x]   Left []    Bilateral [] The following multi-layer bandages were applied: 
[]  Tricofix           
[]  Silver Stockinette [x]  4\" protouch stockinette  
[]  Comperm 
 
[]  Transelast  
 
Padding layer: 
[x]  Cast padding to hand/wrist/forearm with extra padding to bulk out wrist. Applied cast padding  from MCP to antecubital fossa to provide extra padding at elbow and to help prevent foam from causing any indentations in skin for increased comfort. []  Fleece Foam: 
[] 10cm roll 
[x] 12cm roll 
[] 15cm roll 
[] Gray foam 
[] Komprex 
[] Komprex 2 Short stretch bandages:  
[] 4cm [x] 6cm [x] 8cm [x] 10cm  
[] 12cm Tubigrip at top to secure taped areas. Pt able to demonstrate full shoulder ROM and adequate elbow flexion to allow for pt to perform functional tasks. Educated patient in multi-layer bandaging donning principles and precautions. Discussed compression garment options. Pt has utilized [employer] assistant custom circular knit sleeve in the past. Pt would benefit from flat knit garment for improved containment. Pt to bring all of her old garments next visit; says she wants to trial old sleeve that was ordered that she never wore. Explained to pt that this sleeve likely will not fit due to weight gain, however pt wishes to try. Obtained custom garment measurements today and will hold off on order until Friday.  Will plan to discuss switching to custom flat knit if pt willing. Kinesiotaping:   
Girth/Volume measurement: Girth measurements as noted below. Volumes:  
 
                           Right                   Left 4/24/19              3,883. 1mL         2,926. 1mL 
4/10/19              4,099.1mL         2,926. 1mL 
3/7/19                3,750.5mL         2,926. 1mL 
 
% difference: 32.7% (was 28.17% at eval) TOTAL TREATMENT 60 mins ASSESSMENT:  
Treatment effectiveness and tolerance: Applied bandages with increased tension, with patient stating bandaging comfortable upon completion of application. Reinforced need to apply bandages with increased tension should she need to rebandage at home. Rechecked volumes today and pt now with 32% limb volume difference which is improved from 4/10, but still up from eval on 3/7. Pt's volumes are likely up because she continues to rebandage at home and does not apply enough tension. Discussed measuring for custom garments now since it will take a few weeks to get garments. Pt has utilized [employer] custom circular knit sleeve in the past. Pt would benefit from flat knit garment for improved containment. Pt to bring all of her old garments next visit; says she wants to trial old sleeve that was ordered that she never wore. Explained to pt that this sleeve likely will not fit due to weight gain, however pt wishes to try. Obtained custom garment measurements today and will hold off on order until Friday. Will plan to discuss switching to custom flat knit if pt willing. Pt reports decreased compliance with nighttime garment and pump in the past, but pt may benefit from at least nighttime garment for long-term maintenance of chronic lymphedema. Will have pt bring nighttime garments in to assess fit. Pt asking again today if she will need to wear nighttime garments every night.  Explained to pt that she most definitely needs nighttime compression due to long-standing history of lymphedema and high limb volume difference. She will need to increase compliance with nightly compression. Progress toward goals: Progressing towards goals. PLAN OF CARE:  
Changes to the plan of care: 1. Right UE resume MLD and continue MLB. 2. Progress HEP and self-MLD. 3. Measure for garments next visit. Frequency: [x]  2 times a week 
[]  Weekly []  Biweekly []  Monthly Other:   
 
 
Noemi Adams.  Talat, PT, DPT, CLT

## 2019-04-26 ENCOUNTER — HOSPITAL ENCOUNTER (OUTPATIENT)
Dept: PHYSICAL THERAPY | Age: 69
Discharge: HOME | End: 2019-04-26
Payer: MEDICARE

## 2019-04-26 PROCEDURE — 97140 MANUAL THERAPY 1/> REGIONS: CPT

## 2019-04-26 PROCEDURE — 97760 ORTHOTIC MGMT&TRAING 1ST ENC: CPT

## 2019-04-26 NOTE — PROGRESS NOTES
_                                   
[facility] [address] [place] Alvarado 20 LYmphedema Therapy Visit: 9 [x]                  Daily note               []                 30 day/10th visit progress note NAME: Juan Joseph DATE: 4/26/2019 GOALS Short term goals Time frame: 4 weeks 1. Patient will demonstrate knowledge of signs/symptoms of infections/cellulitis and be independent in skin care to prevent cellulitis. MET 4/24/19 2. Patient will demonstrate independence in lymphedema home program of therapeutic exercises to improve circulation and decongest limb to improve ADLs. MET 4/24/19 3. Patient will tolerate multi-layer bandages (MLB) and show measureable decrease in R UE limb volume to <25% limb volume difference to allow ordering of home compression system (daytime, nighttime garments and pump as needed).  
  
Long term goals Time frame: 8 weeks 1. Pt will show improvement in Lymphedema Life Impact Scale by 10 points for improved tolerance for ADLs and recreational activities. 2.  Patient will be independent with don/doff of compression system and use in order to prevent reaccumulation of fluid at discharge. 3.  Pt will be independent in self-MLD and show stable limb volumes showing decongestion and pt. ready for transition to independent restorative phase of lymphedema therapy. SUBJECTIVE REPORT:  Patient arrives to appt with bandages intact right UE. Pt brought all of her old garments today to assess fit. Pain: 
 pt denies pain Gait:   
[x]  Independent gait without any assistive device for community distances ADLs:  Mod I Treatment Response:  Good response to garment fitting 
[x]   Patient reviewed packet received at evaluation 
[]   Patient completed home program as prescribed 
[]   Patient not fully compliant with home program to date []   Patient waiting on compression garment arrival 
Function: limited by right UE swelling 
[]   Patient requiring less assistance with completion of home program 
[]   ADLs are requiring less assistance  
[]   Patient able to return to work/leisure activities Lymphedema Life Impact Scale: NT Weight: NT 
TREATMENT AND OBJECTIVE DATA SUMMARY:  
Patient/Family Education: Compression garment routine: 1. Apply compression sleeve and gauntlet to clean, dry arm/hand first thing in the morning. 2. Wear garment ALL DAY until bedtime. 3. Launder garment nightly either by hand or washing machine in a garment bag or pillowcase. Use mild detergent with NO FABRIC SOFTENER, NO BLEACH, NO WOOLITE. Wash in cool/warm water. 4. Dry garment in dryer on low heat right side out in garment bag or pillowcase. 5. Wear nighttime garment EVERY night (either solaris tribute, Nile Fletcher, or optiflow). 6. Perform skin care to arm, cleansing skin daily with soap and water, and using low pH lotion, upward strokes to stimulate lymphatic vessels. 7. Daytime sleeve and glove are NOT safe to sleep in, so remove at bedtime. 8. Perform exercise program with compression garments on. Signs/Symptoms of infection include:  Fever, flu-like symptoms, pain in arm, redness/warmth arm, sometimes with increase in swelling present. Stop wearing your compression garments if this occurs. Call your doctor immediately or go to the Emergency room to address potential infection. Remove compression with changes in circulation in arm, numbness in arm different from what you may experience when not wearing compression sleeve and glove, pain, unexplained shortness of breath Educated in skin care: [x]   Skin care products []   Hygiene 
[]  Prevention of cellulitis 
[]   Wound care Educated in exercise: [x]   Walking program 
[x]   Jacqueline messer routine - pt has jacqueline messre at home, advised pt to perform ex's daily with compression bandages []   Stick routine 
[]   ROM routine Instructed in self MLD:  
Written sequence given and reviewed with patient as well as demonstration and instruction during MLD portion of the session. Instructed in don/doff of compression system:  
[]   Multi layer bandage (MLB) donning principles and wear precautions [x]   Day garments [x]   Night garments Therapeutic Exercise/Procedure minutes Treatment time:  
Jacqueline ball exercise program: Demonstration by therapist of written routine. Patient performed each x5 reps. Stick exercise program: N/A Free exercises/ROM: N/A Home program: Patient to perform daily to BID: 
[x]   Skin care [x]   Deep abdominal breathing 
[x]   Exercise routine [x]   Walking program 
[]   Rest in supine  
[]   Compression bandage 
[x]   Compression garments 
[]   Vasopneumatic device 
[]   Wound care [x]   Self MLD [x]   Bring supplies to each therapy visit 
[]   Purchase necessary supplies 
[]   Weight loss program 
[]   Follow up with Rationale: Exercise will increase the lymph angiomotoricity and tissue pressure of the skin and thus decrease swelling. Modalities  minutes Treatment time: Deferred Vasopneumatic pump:   
 
Manual therapy: 8:25 - 8:35 am 
Orthotic management: 8:35 - 9:05 am 10 minutes 30 minutes Area to decongest:   
[x] UE 
        [x]  Right     []  Left      [] Trunk 
 
 
[] LE   
         []  Right     []  Left      [] Trunk Sequence used and effectiveness: Deferred due to limited time [] Secondary sequence for upper extremity with / without trunk involvement  
 
[] Secondary/Primary sequence for lower extremities with / without trunk involvement  
 
[] Modifications were made to manual lymph drainage sequence to exclude cervical techniques secondary to patient's age 
 
[] Self MLD sequence/techniques/hand strokes - instructed in self MLD for trunk and chest wall to perform at home daily along with diaphragmatic breathing. Will review full self-MLD sequence once pt has transitioned to garments. Skin/wound care/debridement: MLB removed and right UE cleansed with hygienic wipes. Upper/Lower extremity compression: Pt brought all of her old garments today to see if they are usable; says she wants to trial old sleeve that was ordered that she never wore (still has tags on it). Explained to pt that this sleeve likely will not fit due to weight gain, however pt wishes to try. Assessed fit of the following today: 1. Juzo custom 3512 circular knit sleeve - able to don sleeve however it does appear slightly small. Pt says it felt comfortable though and wishes to trial sleeve over the weekend until next appt. Pt has old flat knit sleeve as well that appears worn and ineffective. Pt will need second custom sleeve but prefers to wait to order until next visit to see if measurements are down. Encouraged pt to use donning gloves to prevent tearing of fabric. 2. Juzo custom flat knit gauntlet - poor fit, too stretched out and worn. Pt says she does not want to wear hand piece. Explained to pt that it is always recommended to wear hand piece to prevent swelling from being pushed into hand, however pt has declined. 3. Solaris tribute - fair fit, slightly loose at the proximal end. 4. Jovipak - good fit noted, pt able to don/doff independently. Pt also has optiflow she brought but says she was never able to tolerate sleeping in this garment. Obtained custom garment measurements last visit and will hold off on order until next week per pt request. Encouraged pt to order flat knit vs circular knit garment so she can alternate and flat knit will offer improved containment. Reviewed garment wear schedule, wash instructions, skin care routine, exercise/self-MLD, and emphasized consistent use of daytime AND nighttime garment until next visit. Kinesiotaping: Girth/Volume measurement: Girth measurements as noted below. Volumes:  
 
                           Right                   Left 4/24/19              3,883. 1mL         2,926. 1mL 
4/10/19              4,099.1mL         2,926. 1mL 
3/7/19                3,750.5mL         2,926. 1mL 
 
% difference: 32.7% (was 28.17% at eval) TOTAL TREATMENT 40 mins ASSESSMENT:  
Treatment effectiveness and tolerance: Pt brought all of her old garments today to see if they are usable. Assessed fit of [employer] custom 3512 circular knit sleeve - able to don sleeve however it does appear slightly small. Pt says it felt comfortable though and wishes to trial sleeve over the weekend until next appt. Pt will need second custom sleeve but prefers to wait to order until next visit to see if measurements are down. Also assessed fit of solaris tribute and jovipak for nighttime. Reviewed garment wear schedule, wash instructions, skin care routine, exercise/self-MLD, and emphasized consistent use of daytime AND nighttime garment until next visit. Obtained custom garment measurements last visit and will hold off on order until next week per pt request. Encouraged pt to order flat knit vs circular knit garment so she can alternate and flat knit will offer improved containment. Pt reports decreased compliance with nighttime garment and pump in the past, but pt may benefit from at least nighttime garment for long-term maintenance of chronic lymphedema. Progress toward goals: Progressing towards goals. PLAN OF CARE:  
Changes to the plan of care: 1. Recheck volumes. 2. Obtain measurements for replacement custom garments - encourage ordering flat knit sleeve. 3. If volumes stable next visit, may transition to daily garment wear of current circular knit sleeve and nighttime garment until she receives new custom sleeve. Frequency: [x]  2 times a week 
[]  Weekly []  Biweekly []  Monthly Other: Chaitanya Gilliland, PT, DPT, CLT

## 2019-05-01 ENCOUNTER — HOSPITAL ENCOUNTER (OUTPATIENT)
Dept: PHYSICAL THERAPY | Age: 69
Discharge: HOME | End: 2019-05-01
Payer: MEDICARE

## 2019-05-01 PROCEDURE — 97763 ORTHC/PROSTC MGMT SBSQ ENC: CPT

## 2019-05-01 PROCEDURE — 97140 MANUAL THERAPY 1/> REGIONS: CPT

## 2019-05-01 NOTE — PROGRESS NOTES
_                                   
[address] [facility] [address] [place] Alvar[place] 20 LYmphedema Therapy Visit: 10 
  []                  Daily note               [x]                 30 day/10th visit progress note NAME: Mckinley Valdes DATE: 5/1/2019 GOALS Short term goals Time frame: 4 weeks 1. Patient will demonstrate knowledge of signs/symptoms of infections/cellulitis and be independent in skin care to prevent cellulitis. MET 4/24/19 2. Patient will demonstrate independence in lymphedema home program of therapeutic exercises to improve circulation and decongest limb to improve ADLs. MET 4/24/19 3. Patient will tolerate multi-layer bandages (MLB) and show measureable decrease in R UE limb volume to <25% limb volume difference to allow ordering of home compression system (daytime, nighttime garments and pump as needed). NOT MET, pt with 32.98% limb volume difference 
  
Long term goals Time frame: 8 weeks 1. Pt will show improvement in Lymphedema Life Impact Scale by 10 points for improved tolerance for ADLs and recreational activities. 2.  Patient will be independent with don/doff of compression system and use in order to prevent reaccumulation of fluid at discharge. MET 5/1/19 3. Pt will be independent in self-MLD and show stable limb volumes showing decongestion and pt. ready for transition to independent restorative phase of lymphedema therapy. SUBJECTIVE REPORT:  Patient arrives to appt with old custom sleeve on her arm; says she switched to another custom sleeve she had at home because sleeve that was fit last visit was too tight. Pt says she also has been wearing solaris tribute for [employer] of the night. \" Pt pleased with reduction in swelling, says she notices clothes are fitting better. Pt ready to be measured today for new custom garment. Pain: 
 pt denies pain Gait:   
[x]  Independent gait without any assistive device for community distances ADLs:  Mod I Treatment Response:  Good response to garment fitting 
[x]   Patient reviewed packet received at evaluation 
[]   Patient completed home program as prescribed 
[]   Patient not fully compliant with home program to date 
[]   Patient waiting on compression garment arrival 
Function: limited by right UE swelling 
[]   Patient requiring less assistance with completion of home program 
[]   ADLs are requiring less assistance  
[]   Patient able to return to work/leisure activities Lymphedema Life Impact Scale: NT Weight: NT 
TREATMENT AND OBJECTIVE DATA SUMMARY:  
Patient/Family Education: Compression garment routine: 1. Apply compression sleeve and gauntlet to clean, dry arm/hand first thing in the morning. 2. Wear garment ALL DAY until bedtime. 3. Launder garment nightly either by hand or washing machine in a garment bag or pillowcase. Use mild detergent with NO FABRIC SOFTENER, NO BLEACH, NO WOOLITE. Wash in cool/warm water. 4. Dry garment in dryer on low heat right side out in garment bag or pillowcase. 5. Wear nighttime garment EVERY night (either solaris tribute, Ozella Salmons, or optiflow). 6. Perform skin care to arm, cleansing skin daily with soap and water, and using low pH lotion, upward strokes to stimulate lymphatic vessels. 7. Daytime sleeve and glove are NOT safe to sleep in, so remove at bedtime. 8. Perform exercise program with compression garments on. Signs/Symptoms of infection include:  Fever, flu-like symptoms, pain in arm, redness/warmth arm, sometimes with increase in swelling present. Stop wearing your compression garments if this occurs. Call your doctor immediately or go to the Emergency room to address potential infection.  
Remove compression with changes in circulation in arm, numbness in arm different from what you may experience when not wearing compression sleeve and glove, pain, unexplained shortness of breath Educated in skin care: [x]   Skin care products []   Hygiene 
[]  Prevention of cellulitis 
[]   Wound care Educated in exercise: [x]   Walking program 
[x]   Jacqueline messer routine - pt has jacqueline messer at home, advised pt to perform ex's daily with compression bandages  
[]   Stick routine 
[]   ROM routine Instructed in self MLD:  
Written sequence given and reviewed with patient as well as demonstration and instruction during MLD portion of the session. Instructed in don/doff of compression system:  
[]   Multi layer bandage (MLB) donning principles and wear precautions [x]   Day garments [x]   Night garments Therapeutic Exercise/Procedure minutes Treatment time:  
Jacqueline messer exercise program: Demonstration by therapist of written routine. Patient performed each x5 reps. Stick exercise program: N/A Free exercises/ROM: N/A Home program: Patient to perform daily to BID: 
[x]   Skin care [x]   Deep abdominal breathing 
[x]   Exercise routine [x]   Walking program 
[]   Rest in supine  
[]   Compression bandage 
[x]   Compression garments 
[]   Vasopneumatic device 
[]   Wound care [x]   Self MLD [x]   Bring supplies to each therapy visit 
[]   Purchase necessary supplies 
[]   Weight loss program 
[]   Follow up with Rationale: Exercise will increase the lymph angiomotoricity and tissue pressure of the skin and thus decrease swelling. Modalities  minutes Treatment time: Deferred Vasopneumatic pump:   
 
Manual therapy: 8:30 - 8:55 am 
Orthotic management: 8:55 - 9:10 am 25 minutes 15 minutes Area to decongest:   
[x] UE 
        [x]  Right     []  Left      [] Trunk 
 
 
[] LE   
         []  Right     []  Left      [] Trunk Sequence used and effectiveness:  [] Secondary sequence for upper extremity with / without trunk involvement  
 
[] Secondary/Primary sequence for lower extremities with / without trunk involvement  
 
[] Modifications were made to manual lymph drainage sequence to exclude cervical techniques secondary to patient's age 
 
[] Self MLD sequence/techniques/hand strokes - instructed in self MLD prior. Skin/wound care/debridement: Pt to perform skin care routine at home nightly. Upper/Lower extremity compression: Pt arrives wearing an older custom circular knit sleeve without hand piece. Pt says she has hand piece at home but has never worn it in the past and denies any hand swelling. Discussed risks associated with not wearing hand piece, pt verbalized understanding. Pt says she tried circular knit sleeve that was fit last visit that had never been worn, but it felt too tight so she switched back to her old sleeve. Pt says she has also been wearing solaris tribute \"most of the night. \" Assessed fit of the following last visit:  
1. Juzo custom 3512 circular knit sleeve - able to don sleeve however it does appear slightly small. Pt says it felt comfortable though and wishes to trial sleeve over the weekend until next appt. Pt has old flat knit sleeve as well that appears worn and ineffective. Pt will need second custom sleeve but prefers to wait to order until next visit to see if measurements are down. Encouraged pt to use donning gloves to prevent tearing of fabric. 2. Juzo custom flat knit gauntlet - poor fit, too stretched out and worn. Pt says she does not want to wear hand piece. Explained to pt that it is always recommended to wear hand piece to prevent swelling from being pushed into hand, however pt has declined. 3. Solaris tribute - fair fit, slightly loose at the proximal end. 4. Jovipak - good fit noted, pt able to don/doff independently. Pt also has optiflow she brought but says she was never able to tolerate sleeping in this garment. Pt was measured today for Juzo custom circular knit sleeve 3512. Encouraged pt to order flat knit vs circular knit garment for improved containment, however pt declined. Pt also declined ordering hand piece as recommended. Reviewed garment wear schedule, wash instructions, skin care routine, exercise/self-MLD, and emphasized consistent use of daytime AND nighttime garment until next visit. Kinesiotaping:   
Girth/Volume measurement: Girth measurements as noted below. Volumes:  
 
                           Right                   Left 5/1/19                3, 890.7mL        2,926. 1mL  
4/24/19              3,883. 1mL         2,926. 1mL 
4/10/19              4,099.1mL         2,926. 1mL 
3/7/19                3,750.5mL         2,926. 1mL 
 
% difference: 32.9% (was 28.17% at eval) TOTAL TREATMENT 40 mins ASSESSMENT:  
Treatment effectiveness and tolerance: Pt has been wearing her old sleeve and using solaris tribute at night since last visit. Noted volumes stable, however they are up from eval likely due to inconsistent compression and compliance with phase I CDT. Pt eager to transition back to garments so was measured today for [employer] 3512 custom circular knit sleeve. Encouraged pt to order flat knit vs circular knit garment for improved containment, however pt declined. Pt also declined ordering hand piece as recommended. Reviewed garment wear schedule, wash instructions, skin care routine, exercise/self-MLD, and emphasized consistent use of daytime AND nighttime garment until next visit. Pt to return for garment fitting once she receives new custom sleeve. Progress toward goals: Progressing towards goals. PLAN OF CARE:  
Changes to the plan of care: 1. Fit garments upon arrival.  
2. Transition to phase II CDT if volumes stable. Frequency: []  2 times a week 
[]  Weekly [x]  Biweekly []  Monthly Other:   
 
 
Gregg Gilliland, PT, DPT, CLT

## 2019-05-03 ENCOUNTER — APPOINTMENT (OUTPATIENT)
Dept: PHYSICAL THERAPY | Age: 69
End: 2019-05-03
Payer: MEDICARE

## 2019-05-23 ENCOUNTER — APPOINTMENT (OUTPATIENT)
Dept: PHYSICAL THERAPY | Age: 69
End: 2019-05-23
Payer: MEDICARE

## 2019-05-24 ENCOUNTER — HOSPITAL ENCOUNTER (OUTPATIENT)
Dept: PHYSICAL THERAPY | Age: 69
Discharge: HOME | End: 2019-05-24
Payer: MEDICARE

## 2019-05-24 PROCEDURE — 97763 ORTHC/PROSTC MGMT SBSQ ENC: CPT

## 2019-05-24 NOTE — PROGRESS NOTES
[facility]  Lymphedema Clinic and [facility]  [address] [place] [address]  [address]  [address]        LYmphedema Therapy  Visit: 11    [x]                  Daily note               []                 30 day/10th visit progress note    NAME: Leonor Jefferson  DATE: 5/24/2019    GOALS  Short term goals  Time frame: 4 weeks  1. Patient will demonstrate knowledge of signs/symptoms of infections/cellulitis and be independent in skin care to prevent cellulitis. MET 4/24/19  2. Patient will demonstrate independence in lymphedema home program of therapeutic exercises to improve circulation and decongest limb to improve ADLs. MET 4/24/19  3. Patient will tolerate multi-layer bandages (MLB) and show measureable decrease in R UE limb volume to <25% limb volume difference to allow ordering of home compression system (daytime, nighttime garments and pump as needed). NOT MET, pt with 32.98% limb volume difference     Long term goals  Time frame: 8 weeks  1. Pt will show improvement in Lymphedema Life Impact Scale by 10 points for improved tolerance for ADLs and recreational activities. 2.  Patient will be independent with don/doff of compression system and use in order to prevent reaccumulation of fluid at discharge. MET 5/1/19  3. Pt will be independent in self-MLD and show stable limb volumes showing decongestion and pt. ready for transition to independent restorative phase of lymphedema therapy. SUBJECTIVE REPORT:  Patient arrives to appt with newly ordered custom sleeve for fitting today; says she has continued to wear her old sleeve during the day and solaris tribute at night.     Pain:   pt denies pain                             Gait:    [x]  Independent gait without any assistive device for community distances  ADLs:  Mod I   Treatment Response:  Good response to garment fitting  [x]   Patient reviewed packet received at evaluation  [] Patient completed home program as prescribed  []   Patient not fully compliant with home program to date  []   Patient waiting on compression garment arrival  Function: limited by right UE swelling  []   Patient requiring less assistance with completion of home program  []   ADLs are requiring less assistance   []   Patient able to return to work/leisure activities  Lymphedema Life Impact Scale: NT  Weight: NT  TREATMENT AND OBJECTIVE DATA SUMMARY:   Patient/Family Education: Compression garment routine:  1. Apply compression sleeve and gauntlet to clean, dry arm/hand first thing in the morning. 2. Wear garment ALL DAY until bedtime. 3. Launder garment nightly either by hand or washing machine in a garment bag or pillowcase. Use mild detergent with NO FABRIC SOFTENER, NO BLEACH, NO WOOLITE. Wash in cool/warm water. 4. Dry garment in dryer on low heat right side out in garment bag or pillowcase. 5. Wear nighttime garment EVERY night (either solaris tribute, Michelle Doll, or optiflow). 6. Perform skin care to arm, cleansing skin daily with soap and water, and using low pH lotion, upward strokes to stimulate lymphatic vessels. 7. Daytime sleeve and glove are NOT safe to sleep in, so remove at bedtime. 8. Perform exercise program with compression garments on. Signs/Symptoms of infection include:  Fever, flu-like symptoms, pain in arm, redness/warmth arm, sometimes with increase in swelling present. Stop wearing your compression garments if this occurs. Call your doctor immediately or go to the Emergency room to address potential infection.   Remove compression with changes in circulation in arm, numbness in arm different from what you may experience when not wearing compression sleeve and glove, pain, unexplained shortness of breath     Educated in skin care: [x]   Skin care products  []   Hygiene  []  Prevention of cellulitis  []   Wound care     Educated in exercise: [x]   Walking program  [x]   Jacqueline messer routine - pt has jacqueline messer at home, advised pt to perform ex's daily with compression bandages   []   Stick routine  []   ROM routine     Instructed in self MLD:   Written sequence given and reviewed with patient as well as demonstration and instruction during MLD portion of the session. Instructed in don/doff of compression system:   []   Multi layer bandage (MLB) donning principles and wear precautions  [x]   Day garments  [x]   Night garments     Therapeutic Exercise/Procedure  minutes   Treatment time:   Jacqueline messer exercise program: Demonstration by therapist of written routine. Patient performed each x5 reps. Stick exercise program: N/A   Free exercises/ROM: N/A   Home program: Patient to perform daily to BID:  [x]   Skin care  [x]   Deep abdominal breathing  [x]   Exercise routine  [x]   Walking program  []   Rest in supine   []   Compression bandage  [x]   Compression garments  []   Vasopneumatic device  []   Wound care  [x]   Self MLD  [x]   Bring supplies to each therapy visit  []   Purchase necessary supplies  []   Weight loss program  []   Follow up with       Rationale: Exercise will increase the lymph angiomotoricity and tissue pressure of the skin and thus decrease swelling. Modalities  minutes   Treatment time: Deferred  Vasopneumatic pump:      Orthotic management: 1:45 - 2:00 pm 15 minutes     Area to decongest:    [x] UE          [x]  Right     []  Left      [] Trunk      [] LE             []  Right     []  Left      [] Trunk       Sequence used and effectiveness:  [] Secondary sequence for upper extremity with / without trunk involvement     [] Secondary/Primary sequence for lower extremities with / without trunk involvement     [] Modifications were made to manual lymph drainage sequence to exclude cervical techniques secondary to patient's age    [] Self MLD sequence/techniques/hand strokes - instructed in self MLD prior.     Skin/wound care/debridement: Pt to perform skin care routine at home nightly. Upper/Lower extremity compression: Pt arrives wearing an older custom circular knit sleeve without hand piece. Pt says she has hand piece at home but has never worn it in the past and denies any hand swelling. Discussed risks associated with not wearing hand piece, pt verbalized understanding. Pt says she has also been wearing solaris tribute \"most of the night. \"     Pt fit today for newly ordered [email or web address] custom circular knit sleeve 3512. Encouraged pt to order flat knit vs circular knit garment for improved containment, however pt declined. Pt also declined ordering hand piece as recommended. Good fit noted of new sleeve, however garment missing silicone border. Reviewed garment order that had silicone border checked. [facility] for garment to be sent back and silicone border added. Pt in agreement with plan. Reviewed garment wear schedule, wash instructions, skin care routine, exercise/self-MLD, and emphasized consistent use of daytime AND nighttime garment until next visit. Kinesiotaping:    Girth/Volume measurement: Girth measurements as noted below. Volumes:                                Right                   Left  5/1/19                3, 890.7mL        2,926. 1mL   4/24/19              3,883. 1mL         2,926. 1mL  4/10/19              4,099.1mL         2,926. 1mL  3/7/19                3,750.5mL         2,926. 1mL    % difference: 32.9% (was 28.17% at eval)     TOTAL TREATMENT 15 mins     ASSESSMENT:   Treatment effectiveness and tolerance: Pt fit today for newly ordered [employer] custom circular knit sleeve 3512. Encouraged pt to order flat knit vs circular knit garment for improved containment, however pt declined. Pt also declined ordering hand piece as recommended. Good fit noted of new sleeve, however garment missing silicone border. Reviewed garment order that had silicone border checked.  Called Natasha from [employer] and requested RA for garment to be sent back and silicone border added. Pt in agreement with plan. Reviewed garment wear schedule, wash instructions, skin care routine, exercise/self-MLD, and emphasized consistent use of daytime AND nighttime garment until next visit. Pt to return for garment fitting once she receives new custom sleeve. Progress toward goals: Progressing towards goals. PLAN OF CARE:   Changes to the plan of care: 1. Fit garments upon arrival.   2. Transition to phase II CDT if volumes stable. Frequency: []  2 times a week  []  Weekly  [x]  Biweekly  []  Monthly   Other:        Yaron Medrano.  Talat, PT, DPT, CLT